# Patient Record
Sex: FEMALE | Race: WHITE
[De-identification: names, ages, dates, MRNs, and addresses within clinical notes are randomized per-mention and may not be internally consistent; named-entity substitution may affect disease eponyms.]

---

## 2019-01-10 ENCOUNTER — HOSPITAL ENCOUNTER (OUTPATIENT)
Dept: HOSPITAL 42 - ED | Age: 59
Setting detail: OBSERVATION
LOS: 1 days | Discharge: HOME | End: 2019-01-11
Attending: INTERNAL MEDICINE | Admitting: INTERNAL MEDICINE
Payer: SELF-PAY

## 2019-01-10 VITALS — BODY MASS INDEX: 29.5 KG/M2

## 2019-01-10 DIAGNOSIS — Z82.3: ICD-10-CM

## 2019-01-10 DIAGNOSIS — R20.2: ICD-10-CM

## 2019-01-10 DIAGNOSIS — Z82.49: ICD-10-CM

## 2019-01-10 DIAGNOSIS — I25.10: ICD-10-CM

## 2019-01-10 DIAGNOSIS — R55: Primary | ICD-10-CM

## 2019-01-10 LAB
ALBUMIN SERPL-MCNC: 4.6 G/DL (ref 3–4.8)
ALBUMIN/GLOB SERPL: 1.3 {RATIO} (ref 1.1–1.8)
ALT SERPL-CCNC: 32 U/L (ref 7–56)
APPEARANCE UR: CLEAR
AST SERPL-CCNC: 26 U/L (ref 14–36)
BASOPHILS # BLD AUTO: 0.06 K/MM3 (ref 0–2)
BASOPHILS NFR BLD: 1.5 % (ref 0–3)
BILIRUB UR-MCNC: NEGATIVE MG/DL
BUN SERPL-MCNC: 6 MG/DL (ref 7–21)
CALCIUM SERPL-MCNC: 9.8 MG/DL (ref 8.4–10.5)
COLOR UR: (no result)
EOSINOPHIL # BLD: 0.1 10*3/UL (ref 0–0.7)
EOSINOPHIL NFR BLD: 2.2 % (ref 1.5–5)
ERYTHROCYTE [DISTWIDTH] IN BLOOD BY AUTOMATED COUNT: 12.4 % (ref 11.5–14.5)
GFR NON-AFRICAN AMERICAN: > 60
GLUCOSE UR STRIP-MCNC: NEGATIVE MG/DL
GRANULOCYTES # BLD: 1.57 10*3/UL (ref 1.4–6.5)
GRANULOCYTES NFR BLD: 38.7 % (ref 50–68)
HGB BLD-MCNC: 14.1 G/DL (ref 12–16)
LEUKOCYTE ESTERASE UR-ACNC: NEGATIVE LEU/UL
LYMPHOCYTES # BLD: 2.1 10*3/UL (ref 1.2–3.4)
LYMPHOCYTES NFR BLD AUTO: 51.2 % (ref 22–35)
MCH RBC QN AUTO: 29 PG (ref 25–35)
MCHC RBC AUTO-ENTMCNC: 33.3 G/DL (ref 31–37)
MCV RBC AUTO: 87.2 FL (ref 80–105)
MONOCYTES # BLD AUTO: 0.3 10*3/UL (ref 0.1–0.6)
MONOCYTES NFR BLD: 6.4 % (ref 1–6)
PH UR STRIP: 7 [PH] (ref 4.7–8)
PLATELET # BLD: 258 10^3/UL (ref 120–450)
PMV BLD AUTO: 12.9 FL (ref 7–11)
PROT UR STRIP-MCNC: NEGATIVE MG/DL
RBC # BLD AUTO: 4.86 10^6/UL (ref 3.5–6.1)
RBC # UR STRIP: NEGATIVE /UL
SP GR UR STRIP: 1.01 (ref 1–1.03)
TROPONIN I SERPL-MCNC: < 0.01 NG/ML
UROBILINOGEN UR STRIP-ACNC: 0.2 E.U./DL
WBC # BLD AUTO: 4.1 10^3/UL (ref 4.5–11)

## 2019-01-10 PROCEDURE — 82746 ASSAY OF FOLIC ACID SERUM: CPT

## 2019-01-10 PROCEDURE — 81003 URINALYSIS AUTO W/O SCOPE: CPT

## 2019-01-10 PROCEDURE — 93306 TTE W/DOPPLER COMPLETE: CPT

## 2019-01-10 PROCEDURE — 84484 ASSAY OF TROPONIN QUANT: CPT

## 2019-01-10 PROCEDURE — 82607 VITAMIN B-12: CPT

## 2019-01-10 PROCEDURE — 93970 EXTREMITY STUDY: CPT

## 2019-01-10 PROCEDURE — 85025 COMPLETE CBC W/AUTO DIFF WBC: CPT

## 2019-01-10 PROCEDURE — 84443 ASSAY THYROID STIM HORMONE: CPT

## 2019-01-10 PROCEDURE — 72125 CT NECK SPINE W/O DYE: CPT

## 2019-01-10 PROCEDURE — 93005 ELECTROCARDIOGRAM TRACING: CPT

## 2019-01-10 PROCEDURE — 83735 ASSAY OF MAGNESIUM: CPT

## 2019-01-10 PROCEDURE — 97161 PT EVAL LOW COMPLEX 20 MIN: CPT

## 2019-01-10 PROCEDURE — 70496 CT ANGIOGRAPHY HEAD: CPT

## 2019-01-10 PROCEDURE — 70498 CT ANGIOGRAPHY NECK: CPT

## 2019-01-10 PROCEDURE — 36415 COLL VENOUS BLD VENIPUNCTURE: CPT

## 2019-01-10 PROCEDURE — 70450 CT HEAD/BRAIN W/O DYE: CPT

## 2019-01-10 PROCEDURE — 80061 LIPID PANEL: CPT

## 2019-01-10 PROCEDURE — 99285 EMERGENCY DEPT VISIT HI MDM: CPT

## 2019-01-10 PROCEDURE — 84100 ASSAY OF PHOSPHORUS: CPT

## 2019-01-10 PROCEDURE — 83036 HEMOGLOBIN GLYCOSYLATED A1C: CPT

## 2019-01-10 PROCEDURE — 87086 URINE CULTURE/COLONY COUNT: CPT

## 2019-01-10 PROCEDURE — 80053 COMPREHEN METABOLIC PANEL: CPT

## 2019-01-10 PROCEDURE — 95812 EEG 41-60 MINUTES: CPT

## 2019-01-10 NOTE — CT
Date of service: 



01/10/2019



PROCEDURE:  CT Angiography of the neck with contrast



HISTORY:

dizziness, weakness



COMPARISON:

None.



TECHNIQUE:

Contiguous axial images of the neck were obtained from the level of 

the skull-base to the superior mediastinum in the arteriographic 

phase of enhancement. Coronal and sagittal reformats or also 

generated. 



IV contrast dose: 100 cc of Omni 350



Radiation dose:



Total exam DLP = 402.04 mGy-cm.



This CT exam was performed using one or more of the following dose 

reduction techniques: Automated exposure control, adjustment of the 

mA and/or kV according to patient size, and/or use of iterative 

reconstruction technique.



FINDINGS:



RIGHT CAROTID ARTERIES:

Common Carotid Artery: Normal.



Carotid Bifurcation: Normal.



Internal Carotid Artery:Normal.



External Carotid Artery (proximal branches): Normal.



LEFT CAROTID ARTERIES:

Common Carotid Artery: Normal.



Carotid Bifurcation: Normal.



Internal Carotid Artery:Normal.



External Carotid Artery (proximal branches): Normal.



VERTEBRAL ARTERIES:

Right Vertebral Artery: Normal.



Left Vertebral Artery: Normal.



OTHER FINDINGS:

 no aortic atherosclerotic calcification or mural plaque present. 



IMPRESSION:

Normal CT Angiography of the neck.



CT Angiography of the Brain.



HISTORY:

dizziness, weakness



COMPARISON:

None available.



TECHNIQUE:

CT angiography of the intracranial arteries was performed. Coronal 

and sagittal maximum intensity projection reformated images were 

generated.



Radiation dose:



Total exam DLP = 402.04 mGy-cm.



This CT exam was performed using one or more of the following dose 

reduction techniques: Automated exposure control, adjustment of the 

mA and/or kV according to patient size, and/or use of iterative 

reconstruction technique.



FINDINGS:



INTERNAL CEREBRAL ARTERIES:

Unremarkable. The skull base, petrous, cavernous and supraclinoid 

segments are bilaterally widely patent. 



ANTERIOR CEREBRAL ARTERIES:

Unremarkable. A1 and A2 segments are widely patent. Smaller distal 

branches unremarkable, as visualized.



MIDDLE CEREBRAL ARTERIES:

Unremarkable. M1 and M2 segments are widely patent. Perisylvian 

branches grossly symmetric.



POSTERIOR CIRCULATION:

Basilar Artery: Unremarkable.



Distal Vertebral Arteries: Unremarkable.



Posterior Cerebral Arteries: Unremarkable.



Posterior Inferior Cerebellar Arteries: Unremarkable.



ANEURYSM/ VASCULAR MALFORMATIONS:

None.



OTHER FINDINGS:

None. 



IMPRESSION:

Unremarkable CT Angiography of the Brain.

## 2019-01-10 NOTE — ED PDOC
Arrival/HPI





- General


Historian: Patient





- History of Present Illness


Narrative History of Present Illness (Text): 


pt is a 58 yr old F with no PMH who has not visited a doctor for many years who 

presents with numerous complaints including HA, intermittent weakness of all 

four extremities, intermittent dizziness and recent fall with LOC, intermittent 

numbness and tingling of extremities. She additionally complains of intermittent

neck pain, chest pain, SOB, "seeing spots" when dizzy.  Patient states she has a

family history of stroke and cardiovascular illness. She currently endorses only

headache.  She currently denies any facial droop, speech difficulty, confusion, 

chest pain, shortness of breath, abdominal pain, dysuria, stool changes and 

extremity pain/weakness. 


01/10/19 15:35








Time/Duration: > month (3)


Symptom Course: Intermittent





<Bernadette Way - Last Filed: 01/10/19 17:40>





<Marcio Chappell - Last Filed: 01/10/19 19:29>





- General


Chief Complaint: Headache


Time Seen by Provider: 01/10/19 13:41





Past Medical History





- Provider Review


Nursing Documentation Reviewed: Yes





- Past History


Past History: No Previous





- Tetanus Immunization


Tetanus Immunization: Unknown





- Reproductive


Menopause: Yes





- Past Medical History


Past Medical History: No Previous





- Cardiac


Hx Cardiac Disorders: Yes


Hx Hypertension: Yes





- Pulmonary


Hx Respiratory Disorders: No





- Neurological


Hx Neurological Disorder: No





- HEENT


Hx HEENT Disorder: No





- Renal


Hx Renal Disorder: Yes


Hx Kidney Stones: Yes (LEFT)





- Endocrine/Metabolic


Hx Endocrine Disorders: No





- Hematological/Oncological


Hx Blood Disorders: No





- Integumentary


Hx Dermatological Disorder: No





- Musculoskeletal/Rheumatological


Hx Musculoskeletal Disorders: No


Hx Falls: Yes (last saturday, lost LOC)





- Genitourinary/Gynecological


Hx Genitourinary Disorders: No (BX LEFT BREAST MASS)





- Psychiatric


Hx Psychophysiologic Disorder: No


Hx Substance Use: No





- Past Surgical History


Past Surgical History: No Previous





- Surgical History


Hx Cardiac Catheterization: Yes





- Anesthesia


Hx Anesthesia: Yes


Hx Anesthesia Reactions: No


Hx Malignant Hyperthermia: No





- Suicidal Assessment


Feels Threatened In Home Enviroment: No





<Bernadette Way - Last Filed: 01/10/19 17:40>





Family/Social History





- Physician Review


Nursing Documentation Reviewed: Yes


Family/Social History: CVA/TIA, CAD/MI


Smoking Status: Never Smoked


Hx Alcohol Use: No


Hx Substance Use: No


Hx Substance Use Treatment: No





<Bernadette Way - Last Filed: 01/10/19 17:40>





Allergies/Home Meds





<Bernadette Way - Last Filed: 01/10/19 17:40>





<Marcio hCappell - Last Filed: 01/10/19 19:29>


Allergies/Adverse Reactions: 


Allergies





No Known Allergies Allergy (Verified 05/30/15 16:53)


   








Home Medications: 


                                    Home Meds











 Medication  Instructions  Recorded  Confirmed


 


No Known Home Med  05/30/15 01/10/19














Review of Systems





- Physician Review


All systems were reviewed & negative as marked: Yes





- Review of Systems


Constitutional: Normal.  absent: Fevers


Eyes: absent: Vision Changes (not currently)


Respiratory: absent: SOB, Cough


Cardiovascular: absent: Chest Pain


Gastrointestinal: absent: Abdominal Pain, Nausea, Vomiting


Musculoskeletal: Neck Pain.  absent: Back Pain


Skin: absent: Rash, Skin Lesions, Laceration, Abscess


Neurological: Headache.  absent: Dizziness, Focal Weakness, Gait Changes, 

Disequilibrium





<Bernadette Way - Last Filed: 01/10/19 17:40>





Physical Exam


Vital Signs Reviewed: Yes





Vital Signs











  Temp Pulse Resp BP Pulse Ox


 


 01/10/19 14:05  98.2 F  67  18  159/83 H  98











Temperature: Afebrile


Blood Pressure: Hypertensive


Pulse: Regular


Respiratory Rate: Normal


Appearance: Positive for: Well-Appearing, Non-Toxic, Comfortable


Pain Distress: None


Mental Status: Positive for: Alert and Oriented X 3





- Systems Exam


Head: Present: Atraumatic, Normocephalic


Extroacular Muscles: Present: EOMI


Mouth: Present: Moist Mucous Membranes


Neck: Present: Normal Range of Motion.  No: Meningeal Signs, MIDLINE TENDERNESS,

Paraspinal Tenderness


Respiratory/Chest: Present: Clear to Auscultation.  No: Respiratory Distress, 

Accessory Muscle Use


Cardiovascular: Present: Regular Rate and Rhythm, Normal S1, S2.  No: Murmurs


Abdomen: No: Tenderness, Distention, Peritoneal Signs


Upper Extremity: Present: Normal Inspection, Normal ROM, NORMAL PULSES, 

Neurovascularly Intact, Capillary Refill < 2s, Norm 2-Pt Discrimination.  No: 

Cyanosis, Edema, Tenderness, Swelling, Erythema, Deformity


Lower Extremity: Present: Normal Inspection, NORMAL PULSES, Normal ROM, Neurova

scularly Intact, Capillary Refill < 2 s.  No: Edema, CALF TENDERNESS, Cyanosis, 

Stanislav's Sign, Swelling, Erythema, Deformity


Neurological: Present: GCS=15, CN II-XII Intact, Speech Normal, Motor Func 

Grossly Intact, Normal Sensory Function, Gait Normal, Normal 2Pt Descrimination


Skin: Present: Warm, Dry, Normal Color.  No: Rashes


Psychiatric: Present: Alert, Oriented x 3, Normal Insight, Normal Concentration





<Bernadette Way - Last Filed: 01/10/19 17:40>





Vital Signs











  Temp Pulse Resp BP Pulse Ox


 


 01/10/19 19:03   77  20  126/85  100


 


 01/10/19 17:36  98.3 F  86  20  124/74  99


 


 01/10/19 16:39  98.7 F  90  18  124/57 L  100


 


 01/10/19 14:05  98.2 F  67  18  159/83 H  98














<Marcio Chappell - Last Filed: 01/10/19 19:29>





Medical Decision Making


ED Course and Treatment: 


Impression: 58 yr old female with recent LOC d/t dizziness and weakness, no 

current dizziness/weakness, current HA





Plan: 


CBC


CMP


CT head


CTA head and neck


reassess and dispo


01/10/19 15:45





patient discussed with Dr. Fishman, agreed to evaluate for admission


01/10/19 17:02








- Lab Interpretations


Lab Results: 





                                        











Total Bilirubin  0.4 mg/dL (0.2-1.3)   01/10/19  15:13    


 


AST  26 U/L (14-36)   01/10/19  15:13    


 


ALT  32 U/L (7-56)   01/10/19  15:13    


 


Alkaline Phosphatase  76 U/L ()   01/10/19  15:13    


 


Total Protein  8.2 g/dL (5.8-8.3)   01/10/19  15:13    


 


Albumin  4.6 g/dL (3.0-4.8)   01/10/19  15:13    


 


Globulin  3.6 gm/dL  01/10/19  15:13    


 


Albumin/Globulin Ratio  1.3  (1.1-1.8)   01/10/19  15:13    

















                                 01/10/19 15:13 





                                 01/10/19 15:13 





                                   Lab Results





01/10/19 15:13: Sodium 140, Potassium 3.8, Chloride 104, Carbon Dioxide 26, 

Anion Gap 13, BUN 6 L, Creatinine 0.6 L, Est GFR ( Amer) > 60, Est GFR (

Non-Af Amer) > 60, Random Glucose 101, Calcium 9.8, Phosphorus 3.6, Magnesium 

2.0, Total Bilirubin 0.4, AST 26, ALT 32, Alkaline Phosphatase 76, Total Protein

8.2, Albumin 4.6, Globulin 3.6, Albumin/Globulin Ratio 1.3


01/10/19 15:13: WBC 4.1 L, RBC 4.86, Hgb 14.1, Hct 42.4, MCV 87.2, MCH 29.0, 

MCHC 33.3, RDW 12.4, Plt Count 258, MPV 12.9 H, Gran % 38.7 L, Lymph % (Auto) 

51.2 H, Mono % (Auto) 6.4 H, Eos % (Auto) 2.2, Baso % (Auto) 1.5, Gran # 1.57, 

Lymph # (Auto) 2.1, Mono # (Auto) 0.3, Eos # (Auto) 0.1, Baso # (Auto) 0.06











- RAD Interpretation


Narrative RAD Interpretations (Text): 





01/10/19 15:51





                                Radiology Results





Head CT  01/10/19 14:29


IMPRESSION:


No acute intracranial findings 


 


 











                                Radiology Results





Head CT  01/10/19 14:29


IMPRESSION:


No acute intracranial findings 


 


 








Head/Neck CTA  01/10/19 15:17


IMPRESSION:


Normal CT Angiography of the neck.


 


CT Angiography of the Brain.


 


HISTORY:


dizziness, weakness


 


COMPARISON:


None available.


 


TECHNIQUE:


CT angiography of the intracranial arteries was performed. Coronal 


and sagittal maximum intensity projection reformated images were 


generated.


 


Radiation dose:


 


Total exam DLP = 402.04 mGy-cm.


 


This CT exam was performed using one or more of the following dose 


reduction techniques: Automated exposure control, adjustment of the 


mA and/or kV according to patient size, and/or use of iterative 


reconstruction technique.


 


FINDINGS:


 


INTERNAL CEREBRAL ARTERIES:


Unremarkable. The skull base, petrous, cavernous and supraclinoid 


segments are bilaterally widely patent. 


 


ANTERIOR CEREBRAL ARTERIES:


Unremarkable. A1 and A2 segments are widely patent. Smaller distal 


branches unremarkable, as visualized.


 


MIDDLE CEREBRAL ARTERIES:


Unremarkable. M1 and M2 segments are widely patent. Perisylvian 


branches grossly symmetric.


 


POSTERIOR CIRCULATION:


Basilar Artery: Unremarkable.


 


Distal Vertebral Arteries: Unremarkable.


 


Posterior Cerebral Arteries: Unremarkable.


 


Posterior Inferior Cerebellar Arteries: Unremarkable.


 


ANEURYSM/ VASCULAR MALFORMATIONS:


None.


 


OTHER FINDINGS:


None. 


 


IMPRESSION:


Unremarkable CT Angiography of the Brain.


 


 











01/10/19 18:30





Radiology Orders: 











01/10/19 14:29


HEAD W/O CONTRAST [CT] Stat 





01/10/19 15:17


CTA HEAD & NECK BUNDLE [CT] Stat 














<Bernadette Way - Last Filed: 01/10/19 17:40>


ED Course and Treatment: 








01/10/19 19:26


Impression: 58 year old female presents to emergency department complaining of 

HA, intermittent weakness of all four extremities, intermittent dizziness and 

recent fall with LOC, intermittent numbness and tingling of extremities.


In agreement with resident note which contains more details about the patient. 

Patient seen and evaluated with resident. Came up with plan and treatment 

together. 


Plan:


-- Antivert


-- Lovenox


-- Labs


-- Rehab (Physical Therapy)


-- Urinalysis


-- US








- Lab Interpretations


Lab Results: 





                                        











Troponin I  < 0.01 ng/mL  01/10/19  19:00    








                                        











Total Bilirubin  0.4 mg/dL (0.2-1.3)   01/10/19  15:13    


 


AST  26 U/L (14-36)   01/10/19  15:13    


 


ALT  32 U/L (7-56)   01/10/19  15:13    


 


Alkaline Phosphatase  76 U/L ()   01/10/19  15:13    


 


Total Protein  8.2 g/dL (5.8-8.3)   01/10/19  15:13    


 


Albumin  4.6 g/dL (3.0-4.8)   01/10/19  15:13    


 


Globulin  3.6 gm/dL  01/10/19  15:13    


 


Albumin/Globulin Ratio  1.3  (1.1-1.8)   01/10/19  15:13    








                                        











Urine Color  Light yellow  (YELLOW)   01/10/19  18:45    


 


Urine Appearance  Clear  (CLEAR)   01/10/19  18:45    


 


Urine pH  7.0  (4.7-8.0)   01/10/19  18:45    


 


Ur Specific Gravity  1.010  (1.005-1.035)   01/10/19  18:45    


 


Urine Protein  Negative mg/dL (<30 mg/dL)   01/10/19  18:45    


 


Urine Glucose (UA)  Negative mg/dL (NEGATIVE)   01/10/19  18:45    


 


Urine Ketones  Negative mg/dL (NEGATIVE)   01/10/19  18:45    


 


Urine Blood  Negative  (NEGATIVE)   01/10/19  18:45    


 


Urine Nitrate  Negative  (NEGATIVE)   01/10/19  18:45    


 


Urine Bilirubin  Negative  (NEGATIVE)   01/10/19  18:45    


 


Urine Urobilinogen  0.2 E.U./dL (<1 E.U./dL)   01/10/19  18:45    


 


Ur Leukocyte Esterase  Negative Payton/uL (NEGATIVE)   01/10/19  18:45    














- RAD Interpretation


Radiology Orders: 











01/10/19 14:29


HEAD W/O CONTRAST [CT] Stat 





01/10/19 15:17


CTA HEAD & NECK BUNDLE [CT] Stat 














- Medication Orders


Current Medication Orders: 











Enoxaparin Sodium (Lovenox)  40 mg SC DAILY FARHAN; Protocol


Meclizine HCl (Antivert)  12.5 mg PO Q6H PRN


   PRN Reason: Dizziness





Discontinued Medications





Acetaminophen (Tylenol 325mg Tab)  650 mg PO STAT STA


   Stop: 01/10/19 16:03


   Last Admin: 01/10/19 17:23  Dose: 650 mg





MAR Pain/Vitals


 Document     01/10/19 17:23  DM  (Rec: 01/10/19 17:24  [facility]  [id number]


     Pain Reassessment


      Is This A Pain ReAssessment?               No


     Presence of Pain


      Presence of Pain                           Yes


     Pain Scale Used


     Protocol:  PSCALES


      Pain Scale Used                            Numeric


     Location


      Left, Right or Bilateral                   Right


      Upper or Lower                             Lower


      Pain Location Body Site                    Arm


      Intensity                                  2





Sodium Chloride (Sodium Chloride 0.9%)  1,000 mls @ 999 mls/hr IV .Q1H1M STA


   Stop: 01/10/19 17:02


   Last Admin: 01/10/19 17:24  Dose: 999 mls/hr





eMAR Start Stop


 Document     01/10/19 17:24  DM  (Rec: 01/10/19 17:25  DM  [id number]


     Intravenous Solution


      Start Date                                 01/10/19


      Start Time                                 17:25





Metoclopramide HCl (Reglan)  10 mg IVP ACHS FARHAN


   Last Admin: 01/10/19 18:55 Dose:  Not Given


   Non-Admin Reason: Patient Refused





IVP Administration


 Document     01/10/19 18:55  DM  (Rec: 01/10/19 18:55  DM  [facility] [place] [id number])


     Charges for Administration


      # of IVP Administrations                   0














<Marcio Chappell - Last Filed: 01/10/19 19:29>





Disposition/Present on Arrival





- Present on Arrival


Any Indicators Present on Arrival: No


History of DVT/PE: No


History of Uncontrolled Diabetes: No


Urinary Catheter: No


History of Decub. Ulcer: No


History Surgical Site Infection Following: None





- Disposition


Have Diagnosis and Disposition been Completed?: Yes


Disposition Time: 17:02


Patient Plan: Admission





<Bernadette Way - Last Filed: 01/10/19 17:40>





<Marcio Chappell - Last Filed: 01/10/19 19:29>





- Disposition


Diagnosis: 


 Paresthesia





Disposition: HOSPITALIZED


Patient Problems: 


                             Current Active Problems











Problem Status Onset


 


Paresthesia Acute 











Condition: STABLE

## 2019-01-10 NOTE — CARD
--------------- APPROVED REPORT --------------





Date of service: 01/10/2019



EKG Measurement

Heart [id number]

NM [id number]

[id number]

[id number]

[id number]



<Conclusion>

Normal sinus rhythm

Normal Electrocardiogram

## 2019-01-10 NOTE — CT
Date of service: 



01/10/2019



PROCEDURE:  CT HEAD WITHOUT CONTRAST.



HISTORY:

fall LOC



COMPARISON:

05/30/2015



TECHNIQUE:

Axial computed tomography images were obtained through the head/brain 

without intravenous contrast.  



Radiation dose:



Total exam DLP = 925.37 mGy-cm.



This CT exam was performed using one or more of the following dose 

reduction techniques: Automated exposure control, adjustment of the 

mA and/or kV according to patient size, and/or use of iterative 

reconstruction technique.



FINDINGS:



HEMORRHAGE:

No intracranial hemorrhage. 



BRAIN:

No mass effect or edema.  No atrophy or chronic microvascular 

ischemic changes.



VENTRICLES:

Unremarkable. No hydrocephalus. 



CALVARIUM:

Unremarkable.



PARANASAL SINUSES:

Unremarkable as visualized. No significant inflammatory changes.



MASTOID AIR CELLS:

Unremarkable as visualized. No inflammatory changes.



OTHER FINDINGS:

None.



IMPRESSION:

No acute intracranial findings

## 2019-01-11 VITALS
TEMPERATURE: 97.5 F | RESPIRATION RATE: 20 BRPM | DIASTOLIC BLOOD PRESSURE: 80 MMHG | OXYGEN SATURATION: 99 % | SYSTOLIC BLOOD PRESSURE: 123 MMHG

## 2019-01-11 VITALS — HEART RATE: 63 BPM

## 2019-01-11 LAB
ALBUMIN SERPL-MCNC: 3.9 G/DL (ref 3–4.8)
ALBUMIN/GLOB SERPL: 1.3 {RATIO} (ref 1.1–1.8)
ALT SERPL-CCNC: 31 U/L (ref 7–56)
AST SERPL-CCNC: 20 U/L (ref 14–36)
BASOPHILS # BLD AUTO: 0.06 K/MM3 (ref 0–2)
BASOPHILS NFR BLD: 1.4 % (ref 0–3)
BUN SERPL-MCNC: 7 MG/DL (ref 7–21)
CALCIUM SERPL-MCNC: 9.4 MG/DL (ref 8.4–10.5)
EOSINOPHIL # BLD: 0.2 10*3/UL (ref 0–0.7)
EOSINOPHIL NFR BLD: 3.4 % (ref 1.5–5)
ERYTHROCYTE [DISTWIDTH] IN BLOOD BY AUTOMATED COUNT: 12.5 % (ref 11.5–14.5)
FOLATE SERPL-MCNC: > 20 NG/ML
GFR NON-AFRICAN AMERICAN: > 60
GRANULOCYTES # BLD: 1.23 10*3/UL (ref 1.4–6.5)
GRANULOCYTES NFR BLD: 28.1 % (ref 50–68)
HDLC SERPL-MCNC: 47 MG/DL (ref 29–60)
HGB BLD-MCNC: 12.4 G/DL (ref 12–16)
LDLC SERPL-MCNC: 105 MG/DL (ref 0–129)
LYMPHOCYTES # BLD: 2.6 10*3/UL (ref 1.2–3.4)
LYMPHOCYTES NFR BLD AUTO: 58.4 % (ref 22–35)
MCH RBC QN AUTO: 28 PG (ref 25–35)
MCHC RBC AUTO-ENTMCNC: 31.8 G/DL (ref 31–37)
MCV RBC AUTO: 88 FL (ref 80–105)
MONOCYTES # BLD AUTO: 0.4 10*3/UL (ref 0.1–0.6)
MONOCYTES NFR BLD: 8.7 % (ref 1–6)
PLATELET # BLD: 244 10^3/UL (ref 120–450)
PMV BLD AUTO: 12.5 FL (ref 7–11)
RBC # BLD AUTO: 4.43 10^6/UL (ref 3.5–6.1)
VIT B12 SERPL-MCNC: 430 PG/ML (ref 239–931)
WBC # BLD AUTO: 4.4 10^3/UL (ref 4.5–11)

## 2019-01-11 NOTE — CP.PCM.CON
<Derian Yadav - Last Filed: 01/11/19 16:58>





History of Present Illness





- History of Present Illness


History of Present Illness: 





Patient is a 58 year old female with past medical history of CAD s/p cardiac 

cath and herniated cervical disc presenting with complaints of syncope a week 

prior as well as 8/10 headache, heaviness, numbness and tingling tingling down 

upper and lower extremities bilaterally since last week. Patient states that she

was walking to her kitchen when she felt lightheaded and lost consciousness and 

later awakening on the floor with her dishes all over her. Patient states she 

has experienced many similar episodes in the past. She denies tongue biting, 

bladder or bowel incontinence. She admits to occasionally seeing flashing 

lights, headache, numbness and tingling of upper and lower extremities. Denies 

any aggravating or alleviating factors. Denies fever, chills, chest pain, 

shortness of breath, abdominal pain, constipation, diarrhea, dysuria. As per 

family at bedside patient has been checking her blood pressure the past few days

and SBP has been in 160s for which brother hs been giving his own BP medications

to control. 





PMH: CAD, herniated disc


PSH: cardiac cath, breast biopsy 


SHx: denies alcohol, tobacco, illicit drug use 


FHx: father (HTN, stroke) 


Allergies: NKDA


Home meds: none 


PMD: none 








Review of Systems





- Review of Systems


All systems: reviewed and no additional remarkable complaints except (what's 

mentioned in HPI)





Past Patient History





- Tetanus Immunizations


Tetanus Immunization: Unknown





- Past Social History


Smoking Status: Never Smoked





- CARDIAC


Hx Cardiac Disorders: Yes


Hx Hypertension: Yes





- PULMONARY


Hx Respiratory Disorders: No





- NEUROLOGICAL


Hx Neurological Disorder: No





- HEENT


Hx HEENT Problems: No





- RENAL


Hx Kidney Stones: Yes (LEFT)





- ENDOCRINE/METABOLIC


Hx Endocrine Disorders: No





- HEMATOLOGICAL/ONCOLOGICAL


Hx Blood Disorders: No





- INTEGUMENTARY


Hx Dermatological Problems: No





- MUSCULOSKELETAL/RHEUMATOLOGICAL


Hx Musculoskeletal Disorders: No


Hx Falls: Yes (syncopal episode on Saturday1/5/19)





- GENITOURINARY/GYNECOLOGICAL


Hx Genitourinary Disorders: No (BX LEFT BREAST MASS)





- PSYCHIATRIC


Hx Psychophysiologic Disorder: No


Hx Substance Use: No





- SURGICAL HISTORY


Hx Surgeries: Yes (CARDIAC CATH)


Hx Cardiac Catheterization: Yes





- ANESTHESIA


Hx Anesthesia: Yes


Hx Anesthesia Reactions: No


Hx Malignant Hyperthermia: No





Meds


Home Medications: 


                              Home Medication List











 Medication  Instructions  Recorded  Confirmed  Type


 


RX: Magnesium Oxide [Magnesium] 400 mg PO BID #60 capsule 01/11/19  Rx











Allergies/Adverse Reactions: 


                                    Allergies











Allergy/AdvReac Type Severity Reaction Status Date / Time


 


No Known Allergies Allergy   Verified 05/30/15 16:53














- Medications


Medications: 


                               Current Medications





Enoxaparin Sodium (Lovenox)  40 mg SC DAILY FARHAN; Protocol


   Last Admin: 01/11/19 09:17 Dose:  40 mg


Meclizine HCl (Antivert)  12.5 mg PO Q6H PRN


   PRN Reason: Dizziness


   Last Admin: 01/11/19 09:17 Dose:  12.5 mg











Physical Exam





- Head Exam


Head Exam: ATRAUMATIC, NORMAL INSPECTION, NORMOCEPHALIC





- Eye Exam


Eye Exam: EOMI, Normal appearance





- ENT Exam


ENT Exam: Mucous Membranes Moist





- Neck Exam


Neck exam: Positive for: Normal Inspection





- Respiratory Exam


Respiratory Exam: Clear to Auscultation Bilateral, NORMAL BREATHING PATTERN





- Cardiovascular Exam


Cardiovascular Exam: REGULAR RHYTHM, +S1, +S2





- GI/Abdominal Exam


GI & Abdominal Exam: Normal Bowel Sounds





- Extremities Exam


Extremities exam: Positive for: normal inspection





- Back Exam


Back exam: NORMAL INSPECTION





- Neurological Exam


Neurological exam: Alert, CN II-XII Intact, Oriented x3





- Expanded Neurological Exam


  ** Expanded


Speech: Fluid Speech


Cranial nerves: EOM's Intact: Normal, Facial Sensation: Normal, Tongue 

Deviation: Normal


Cerebellar Function: Finger to Nose: Normal, Heel to Shin: Normal, Romberg: 

Normal


Upper motor neuron: Babinski Sign: Normal, Erickson Neglect: Normal, Pronator Drift:

 Normal, Sensory Extinction: Normal


Sensory exam: Lower Extremity 2 Point Discrimination: Normal, Lower Extremity 

Light Touch: Normal, Lower Extremity Pin Prick: Normal, Upper Extremity 2 Point 

Discrimination: Normal, Upper Extremity Light Touch: Normal, Upper Extremity Pin

 Prick: Normal


Neuro motor strength exam: Left Upper Extremity: 4, Right Upper Extremity: 4, 

Left Lower Extremity: 4, Right Lower Extremity: 4


DTR: Patellar Left: 0


Coma Scale Eye Opening: SPONTANEOUS


Coma Scale Motor Response: OBEYS COMMANDS





- Psychiatric Exam


Psychiatric exam: Normal Affect, Normal Mood





- Skin


Skin Exam: Normal Color, Warm





Results





- Vital Signs


Recent Vital Signs: 


                                Last Vital Signs











Temp  97.5 F L  01/11/19 06:00


 


Pulse  60   01/11/19 06:00


 


Resp  20   01/11/19 06:00


 


BP  123/80   01/11/19 06:00


 


Pulse Ox  99   01/11/19 06:00














- Labs


Result Diagrams: 


                                 01/11/19 06:00





                                 01/11/19 06:00


Labs: 


                         Laboratory Results - last 24 hr











  01/10/19 01/10/19 01/10/19





  15:13 15:13 18:45


 


WBC  4.1 L  


 


RBC  4.86  


 


Hgb  14.1  


 


Hct  42.4  


 


MCV  87.2  


 


MCH  29.0  


 


MCHC  33.3  


 


RDW  12.4  


 


Plt Count  258  


 


MPV  12.9 H  


 


Gran %  38.7 L  


 


Lymph % (Auto)  51.2 H  


 


Mono % (Auto)  6.4 H  


 


Eos % (Auto)  2.2  


 


Baso % (Auto)  1.5  


 


Gran #  1.57  


 


Lymph # (Auto)  2.1  


 


Mono # (Auto)  0.3  


 


Eos # (Auto)  0.1  


 


Baso # (Auto)  0.06  


 


Sodium   140 


 


Potassium   3.8 


 


Chloride   104 


 


Carbon Dioxide   26 


 


Anion Gap   13 


 


BUN   6 L 


 


Creatinine   0.6 L 


 


Est GFR ( Amer)   > 60 


 


Est GFR (Non-Af Amer)   > 60 


 


Random Glucose   101 


 


Calcium   9.8 


 


Phosphorus   3.6 


 


Magnesium   2.0 


 


Total Bilirubin   0.4 


 


AST   26 


 


ALT   32 


 


Alkaline Phosphatase   76 


 


Troponin I   


 


Total Protein   8.2 


 


Albumin   4.6 


 


Globulin   3.6 


 


Albumin/Globulin Ratio   1.3 


 


Triglycerides   


 


Cholesterol   


 


LDL Cholesterol Direct   


 


HDL Cholesterol   


 


TSH 3rd Generation   


 


Urine Color    Light yellow


 


Urine Appearance    Clear


 


Urine pH    7.0


 


Ur Specific Gravity    1.010


 


Urine Protein    Negative


 


Urine Glucose (UA)    Negative


 


Urine Ketones    Negative


 


Urine Blood    Negative


 


Urine Nitrate    Negative


 


Urine Bilirubin    Negative


 


Urine Urobilinogen    0.2


 


Ur Leukocyte Esterase    Negative














  01/10/19 01/11/19 01/11/19





  19:00 06:00 06:00


 


WBC   4.4 L 


 


RBC   4.43 


 


Hgb   12.4 


 


Hct   39.0 


 


MCV   88.0 


 


MCH   28.0 


 


MCHC   31.8 


 


RDW   12.5 


 


Plt Count   244 


 


MPV   12.5 H 


 


Gran %   28.1 L 


 


Lymph % (Auto)   58.4 H 


 


Mono % (Auto)   8.7 H 


 


Eos % (Auto)   3.4 


 


Baso % (Auto)   1.4 


 


Gran #   1.23 L 


 


Lymph # (Auto)   2.6 


 


Mono # (Auto)   0.4 


 


Eos # (Auto)   0.2 


 


Baso # (Auto)   0.06 


 


Sodium    140


 


Potassium    4.3


 


Chloride    108 H


 


Carbon Dioxide    27


 


Anion Gap    9 L


 


BUN    7


 


Creatinine    0.7


 


Est GFR ( Amer)    > 60


 


Est GFR (Non-Af Amer)    > 60


 


Random Glucose    96


 


Calcium    9.4


 


Phosphorus   


 


Magnesium   


 


Total Bilirubin    0.3


 


AST    20


 


ALT    31


 


Alkaline Phosphatase    60


 


Troponin I  < 0.01  


 


Total Protein    6.9


 


Albumin    3.9


 


Globulin    3.0


 


Albumin/Globulin Ratio    1.3


 


Triglycerides    77


 


Cholesterol    180


 


LDL Cholesterol Direct    105


 


HDL Cholesterol    47


 


TSH 3rd Generation   


 


Urine Color   


 


Urine Appearance   


 


Urine pH   


 


Ur Specific Gravity   


 


Urine Protein   


 


Urine Glucose (UA)   


 


Urine Ketones   


 


Urine Blood   


 


Urine Nitrate   


 


Urine Bilirubin   


 


Urine Urobilinogen   


 


Ur Leukocyte Esterase   














  01/11/19





  06:00


 


WBC 


 


RBC 


 


Hgb 


 


Hct 


 


MCV 


 


MCH 


 


MCHC 


 


RDW 


 


Plt Count 


 


MPV 


 


Gran % 


 


Lymph % (Auto) 


 


Mono % (Auto) 


 


Eos % (Auto) 


 


Baso % (Auto) 


 


Gran # 


 


Lymph # (Auto) 


 


Mono # (Auto) 


 


Eos # (Auto) 


 


Baso # (Auto) 


 


Sodium 


 


Potassium 


 


Chloride 


 


Carbon Dioxide 


 


Anion Gap 


 


BUN 


 


Creatinine 


 


Est GFR ( Amer) 


 


Est GFR (Non-Af Amer) 


 


Random Glucose 


 


Calcium 


 


Phosphorus 


 


Magnesium 


 


Total Bilirubin 


 


AST 


 


ALT 


 


Alkaline Phosphatase 


 


Troponin I 


 


Total Protein 


 


Albumin 


 


Globulin 


 


Albumin/Globulin Ratio 


 


Triglycerides 


 


Cholesterol 


 


LDL Cholesterol Direct 


 


HDL Cholesterol 


 


TSH 3rd Generation  5.74 H


 


Urine Color 


 


Urine Appearance 


 


Urine pH 


 


Ur Specific Gravity 


 


Urine Protein 


 


Urine Glucose (UA) 


 


Urine Ketones 


 


Urine Blood 


 


Urine Nitrate 


 


Urine Bilirubin 


 


Urine Urobilinogen 


 


Ur Leukocyte Esterase 














Assessment & Plan





- Assessment and Plan (Free Text)


Assessment: 





B/L Upper and Lower extremity paresthesias


-r/o Vitamin B12, Folate deficiency


-Vitamin B12 supplementation since patient is vegan





Headaches


-Most likely tension headache


-Toradol


-Magnesium oxide 400 mg BID


-Patient can follow up with a neurologist as outpatient for further management





Syncopal episodes


-CTA head and neck reveal no abnormalities


-Recommend linq cardiac device to monitor for arrythmias 


-Follow up with Primary care physician and cardiologist. Discussed extensively 

with patient that she needs to establish care with a PMD so she can follow up 

with a cardiologist. Discussed with patient that a linq monitor would help 

detect abnormalities that we may not have been able to detect while here inhouse

 since the duration is longer. Discussed plan in full detail with patient and 

her family. 








<Korya,Maik - Last Filed: 01/13/19 13:43>





Results





- Vital Signs


Recent Vital Signs: 


                                Last Vital Signs











Temp  97.5 F L  01/11/19 06:00


 


Pulse  63   01/11/19 10:00


 


Resp  20   01/11/19 06:00


 


BP  123/80   01/11/19 06:00


 


Pulse Ox  99   01/11/19 06:00














- Labs


Result Diagrams: 


                                 01/11/19 06:00





                                 01/11/19 06:00





Attending/Attestation





- Attestation


I have personally seen and examined this patient.: Yes


I have fully participated in the care of the patient.: Yes


I have reviewed all pertinent clinical information: Yes


Notes (Text): 


 I agree with the assessment and plan:


-Follow up with Primary care physician and cardiologist. Discussed extensively 

with patient that she needs to establish care with a PMD so she can follow up 

with a cardiologist. Discussed with patient that a linq monitor would help 

detect abnormalities that we may not have been able to detect while here inhouse

 since the duration is longer.

## 2019-01-11 NOTE — CP.PCM.DIS
<Jessica Jean Baptiste - Last Filed: 01/11/19 15:47>





Provider





- Provider


Date of Admission: 


01/10/19 17:02





Attending physician: 


Eric Fishman MD





Primary care physician: 


NO PRIMARY CARE PROVIDER





Consults: 








01/10/19 17:03


Physician Consult Stat 


   Comment: 


   Consulting Provider: Maik Lezama


   Consulting Physician: Maik Lezama


   Reason for Consult: parasthesias











Time Spent in preparation of Discharge (in minutes): 35





Hospital Course





- Lab Results


Lab Results: 


                             Most Recent Lab Values











WBC  4.4 10^3/uL (4.5-11.0)  L  01/11/19  06:00    


 


RBC  4.43 10^6/uL (3.5-6.1)   01/11/19  06:00    


 


Hgb  12.4 g/dL (12.0-16.0)   01/11/19  06:00    


 


Hct  39.0 % (36.0-48.0)   01/11/19  06:00    


 


MCV  88.0 fl (80.0-105.0)   01/11/19  06:00    


 


MCH  28.0 pg (25.0-35.0)   01/11/19  06:00    


 


MCHC  31.8 g/dl (31.0-37.0)   01/11/19  06:00    


 


RDW  12.5 % (11.5-14.5)   01/11/19  06:00    


 


Plt Count  244 10^3/uL (120.0-450.0)   01/11/19  06:00    


 


MPV  12.5 fl (7.0-11.0)  H  01/11/19  06:00    


 


Gran %  28.1 % (50.0-68.0)  L  01/11/19  06:00    


 


Lymph % (Auto)  58.4 % (22.0-35.0)  H  01/11/19  06:00    


 


Mono % (Auto)  8.7 % (1.0-6.0)  H  01/11/19  06:00    


 


Eos % (Auto)  3.4 % (1.5-5.0)   01/11/19  06:00    


 


Baso % (Auto)  1.4 % (0.0-3.0)   01/11/19  06:00    


 


Gran #  1.23  (1.4-6.5)  L  01/11/19  06:00    


 


Lymph # (Auto)  2.6  (1.2-3.4)   01/11/19  06:00    


 


Mono # (Auto)  0.4  (0.1-0.6)   01/11/19  06:00    


 


Eos # (Auto)  0.2  (0.0-0.7)   01/11/19  06:00    


 


Baso # (Auto)  0.06 K/mm3 (0.0-2.0)   01/11/19  06:00    


 


Sodium  140 mmol/L (132-148)   01/11/19  06:00    


 


Potassium  4.3 mmol/L (3.6-5.0)   01/11/19  06:00    


 


Chloride  108 mmol/L ()  H  01/11/19  06:00    


 


Carbon Dioxide  27 mmol/L (21-33)   01/11/19  06:00    


 


Anion Gap  9  (10-20)  L  01/11/19  06:00    


 


BUN  7 mg/dL (7-21)   01/11/19  06:00    


 


Creatinine  0.7 mg/dl (0.7-1.2)   01/11/19  06:00    


 


Est GFR ( Amer)  > 60   01/11/19  06:00    


 


Est GFR (Non-Af Amer)  > 60   01/11/19  06:00    


 


Random Glucose  96 mg/dL ()   01/11/19  06:00    


 


Hemoglobin A1c  5.9 % (4.2-6.5)   01/11/19  06:00    


 


Calcium  9.4 mg/dL (8.4-10.5)   01/11/19  06:00    


 


Phosphorus  3.6 mg/dL (2.5-4.5)   01/10/19  15:13    


 


Magnesium  2.0 mg/dL (1.7-2.2)   01/10/19  15:13    


 


Total Bilirubin  0.3 mg/dL (0.2-1.3)   01/11/19  06:00    


 


AST  20 U/L (14-36)   01/11/19  06:00    


 


ALT  31 U/L (7-56)   01/11/19  06:00    


 


Alkaline Phosphatase  60 U/L ()   01/11/19  06:00    


 


Troponin I  < 0.01 ng/mL  01/10/19  19:00    


 


Total Protein  6.9 g/dL (5.8-8.3)   01/11/19  06:00    


 


Albumin  3.9 g/dL (3.0-4.8)   01/11/19  06:00    


 


Globulin  3.0 gm/dL  01/11/19  06:00    


 


Albumin/Globulin Ratio  1.3  (1.1-1.8)   01/11/19  06:00    


 


Triglycerides  77 mg/dL ()   01/11/19  06:00    


 


Cholesterol  180 mg/dL (130-200)   01/11/19  06:00    


 


LDL Cholesterol Direct  105 mg/dL (0-129)   01/11/19  06:00    


 


HDL Cholesterol  47 mg/dL (29-60)   01/11/19  06:00    


 


Vitamin B12  430 pg/mL (239-931)   01/11/19  06:00    


 


Folate  > 20.0 ng/mL  01/10/19  19:00    


 


TSH 3rd Generation  5.74 mIU/mL (0.46-4.68)  H  01/11/19  06:00    


 


Urine Color  Light yellow  (YELLOW)   01/10/19  18:45    


 


Urine Appearance  Clear  (CLEAR)   01/10/19  18:45    


 


Urine pH  7.0  (4.7-8.0)   01/10/19  18:45    


 


Ur Specific Gravity  1.010  (1.005-1.035)   01/10/19  18:45    


 


Urine Protein  Negative mg/dL (<30 mg/dL)   01/10/19  18:45    


 


Urine Glucose (UA)  Negative mg/dL (NEGATIVE)   01/10/19  18:45    


 


Urine Ketones  Negative mg/dL (NEGATIVE)   01/10/19  18:45    


 


Urine Blood  Negative  (NEGATIVE)   01/10/19  18:45    


 


Urine Nitrate  Negative  (NEGATIVE)   01/10/19  18:45    


 


Urine Bilirubin  Negative  (NEGATIVE)   01/10/19  18:45    


 


Urine Urobilinogen  0.2 E.U./dL (<1 E.U./dL)   01/10/19  18:45    


 


Ur Leukocyte Esterase  Negative Payton/uL (NEGATIVE)   01/10/19  18:45    














- Hospital Course


Hospital Course: 


    58 year old female with past medical history of CAD s/p cardiac cath and 

herniated cervical disc presenting with chief complaint of syncope that happened

a week prior. Patient states that she was walking to her kitchen when she felt 

lightheaded and lost consciousness. She remembers waking up on the floor and 

feeling weak for several hours after. She denies tongue biting, bladder or bowel

incontinence. She admits to occasionally seeing flashing lights, headache, 

numbness and tingling of upper and lower extremities. Denies any aggravating or 

alleviating factors. Denies fever, chills, chest pain, shortness of breath, 

abdominal pain, constipation, diarrhea, dysuria. As per family at bedside 

patient has been checking her blood pressure the past few days and SBP has been 

in 160s. 


     CT head, CTA of head and neck, and Ct of cervical spine were unremarkable. 

The patient underwent an echocardiogram that was not interpreted. The patient's 

Vitamin B12 and folate were also within normal limits. Neurology was consulted 

and recommended the patient get a Linq cardiac device to monitor for arrhythmias

as well as Magnesium Oxide 400 mg BID. The patient was discharged with the below

written instructions and recommendations. 





- Date & Time of H&P


Date of H&P: 01/11/19


Time of H&P: 15:49





Discharge Exam





- Head Exam


Head Exam: ATRAUMATIC, NORMOCEPHALIC





- Eye Exam


Eye Exam: EOMI, Normal appearance, PERRL





- Respiratory Exam


Respiratory Exam: Clear to PA & Lateral, NORMAL BREATHING PATTERN





- Cardiovascular Exam


Cardiovascular Exam: RRR, +S1, +S2





- Extremities Exam


Extremities exam: normal inspection





- Neurological Exam


Neurological exam: Alert, CN II-XII Intact, Oriented x3





- Psychiatric Exam


Psychiatric exam: Normal Affect, Normal Mood





- Skin


Skin Exam: Dry, Intact, Normal Color, Warm





Discharge Plan





- Discharge Medications


Prescriptions: 


Magnesium Oxide [Magnesium] 400 mg PO BID #60 capsule





- Follow Up Plan


Condition: STABLE


Disposition: HOME/ ROUTINE


Instructions:  Chest Pain (DC), Paresthesias (DC), Hand Numbness


Additional Instructions: 


Please follow up with your primary care doctor within 3-5 days of discharge. If 

you do not have one you can make an appointment with our [facility] 

Clinic. 





Please take a multivitamin daily which is over the counter. 





Recommend to follow up with cardiology as outpatient for possible holter 

monitor. 





You were sent a prescription of Magnesium oxide 400mg ONE PILL TWICE PER DAY. 





If your symptoms return, please go to the nearest emergency department. 


Referrals: 


[facility] [place] [facility] [place] Follow up with primary


PCP,NO [Primary Care Provider] - Follow up with primary





<Rangasamy,Ajantha - Last Filed: 01/11/19 17:48>





Provider





- Provider


Date of Admission: 


01/10/19 17:02





Attending physician: 


Eric Fishman MD





Primary care physician: 


NO PRIMARY CARE PROVIDER





Consults: 








01/10/19 17:03


Physician Consult Stat 


   Comment: 


   Consulting Provider: Maik Lezama


   Consulting Physician: Maik Lezama


   Reason for Consult: [facility] Course





- Lab Results


Lab Results: 


                             Most Recent Lab Values











WBC  4.4 10^3/uL (4.5-11.0)  L  01/11/19  06:00    


 


RBC  4.43 10^6/uL (3.5-6.1)   01/11/19  06:00    


 


Hgb  12.4 g/dL (12.0-16.0)   01/11/19  06:00    


 


Hct  39.0 % (36.0-48.0)   01/11/19  06:00    


 


MCV  88.0 fl (80.0-105.0)   01/11/19  06:00    


 


MCH  28.0 pg (25.0-35.0)   01/11/19  06:00    


 


MCHC  31.8 g/dl (31.0-37.0)   01/11/19  06:00    


 


RDW  12.5 % (11.5-14.5)   01/11/19  06:00    


 


Plt Count  244 10^3/uL (120.0-450.0)   01/11/19  06:00    


 


MPV  12.5 fl (7.0-11.0)  H  01/11/19  06:00    


 


Gran %  28.1 % (50.0-68.0)  L  01/11/19  06:00    


 


Lymph % (Auto)  58.4 % (22.0-35.0)  H  01/11/19  06:00    


 


Mono % (Auto)  8.7 % (1.0-6.0)  H  01/11/19  06:00    


 


Eos % (Auto)  3.4 % (1.5-5.0)   01/11/19  06:00    


 


Baso % (Auto)  1.4 % (0.0-3.0)   01/11/19  06:00    


 


Gran #  1.23  (1.4-6.5)  L  01/11/19  06:00    


 


Lymph # (Auto)  2.6  (1.2-3.4)   01/11/19  06:00    


 


Mono # (Auto)  0.4  (0.1-0.6)   01/11/19  06:00    


 


Eos # (Auto)  0.2  (0.0-0.7)   01/11/19  06:00    


 


Baso # (Auto)  0.06 K/mm3 (0.0-2.0)   01/11/19  06:00    


 


Sodium  140 mmol/L (132-148)   01/11/19  06:00    


 


Potassium  4.3 mmol/L (3.6-5.0)   01/11/19  06:00    


 


Chloride  108 mmol/L ()  H  01/11/19  06:00    


 


Carbon Dioxide  27 mmol/L (21-33)   01/11/19  06:00    


 


Anion Gap  9  (10-20)  L  01/11/19  06:00    


 


BUN  7 mg/dL (7-21)   01/11/19  06:00    


 


Creatinine  0.7 mg/dl (0.7-1.2)   01/11/19  06:00    


 


Est GFR ( Amer)  > 60   01/11/19  06:00    


 


Est GFR (Non-Af Amer)  > 60   01/11/19  06:00    


 


Random Glucose  96 mg/dL ()   01/11/19  06:00    


 


Hemoglobin A1c  5.9 % (4.2-6.5)   01/11/19  06:00    


 


Calcium  9.4 mg/dL (8.4-10.5)   01/11/19  06:00    


 


Phosphorus  3.6 mg/dL (2.5-4.5)   01/10/19  15:13    


 


Magnesium  2.0 mg/dL (1.7-2.2)   01/10/19  15:13    


 


Total Bilirubin  0.3 mg/dL (0.2-1.3)   01/11/19  06:00    


 


AST  20 U/L (14-36)   01/11/19  06:00    


 


ALT  31 U/L (7-56)   01/11/19  06:00    


 


Alkaline Phosphatase  60 U/L ()   01/11/19  06:00    


 


Troponin I  < 0.01 ng/mL  01/10/19  19:00    


 


Total Protein  6.9 g/dL (5.8-8.3)   01/11/19  06:00    


 


Albumin  3.9 g/dL (3.0-4.8)   01/11/19  06:00    


 


Globulin  3.0 gm/dL  01/11/19  06:00    


 


Albumin/Globulin Ratio  1.3  (1.1-1.8)   01/11/19  06:00    


 


Triglycerides  77 mg/dL ()   01/11/19  06:00    


 


Cholesterol  180 mg/dL (130-200)   01/11/19  06:00    


 


LDL Cholesterol Direct  105 mg/dL (0-129)   01/11/19  06:00    


 


HDL Cholesterol  47 mg/dL (29-60)   01/11/19  06:00    


 


Vitamin B12  430 pg/mL (239-931)   01/11/19  06:00    


 


Folate  > 20.0 ng/mL  01/10/19  19:00    


 


TSH 3rd Generation  5.74 mIU/mL (0.46-4.68)  H  01/11/19  06:00    


 


Urine Color  Light yellow  (YELLOW)   01/10/19  18:45    


 


Urine Appearance  Clear  (CLEAR)   01/10/19  18:45    


 


Urine pH  7.0  (4.7-8.0)   01/10/19  18:45    


 


Ur Specific Gravity  1.010  (1.005-1.035)   01/10/19  18:45    


 


Urine Protein  Negative mg/dL (<30 mg/dL)   01/10/19  18:45    


 


Urine Glucose (UA)  Negative mg/dL (NEGATIVE)   01/10/19  18:45    


 


Urine Ketones  Negative mg/dL (NEGATIVE)   01/10/19  18:45    


 


Urine Blood  Negative  (NEGATIVE)   01/10/19  18:45    


 


Urine Nitrate  Negative  (NEGATIVE)   01/10/19  18:45    


 


Urine Bilirubin  Negative  (NEGATIVE)   01/10/19  18:45    


 


Urine Urobilinogen  0.2 E.U./dL (<1 E.U./dL)   01/10/19  18:45    


 


Ur Leukocyte Esterase  Negative Payton/uL (NEGATIVE)   01/10/19  18:45    














Attending/Attestation





- Attestation


I have personally seen and examined this patient.: Yes


I have fully participated in the care of the patient.: Yes


I have reviewed all pertinent clinical information, including history, physical 

exam and plan: Yes


Notes (Text): 





01/11/19 17:38





Attending note;





Patient seen and examined with resident.


Patient is alert and awake.


Complaining of nonspecific numbness.


Denies any neck pain.


No orthostatic blood pressure changes noted.


Tolerating diet well.





Patient is a 58 year old female with past medical history herniated cervical 

disc presenting with chief complaint of syncope that happened a week prior. 

Patient states that she was walking to her kitchen when she felt lightheaded and

lost consciousness.


Patient has complained of nonspecific numbness and weakness all over her body.





1. Syncope; patient complains of generalized nonspecific symptoms.


Questionable loss of consciousness.


No history of injury, tongue bite or incontinence. CT head is negative. CT of 

head and neck is negative.


Neurology evaluation appreciated.


Possible anxiety related issues suspected.


2.  No hypertension noted. No arrhythmia.


EKG showed normal sinus rhythm.


Echocardiogram normal.


3. Leg pain; lower extremity Doppler is negative.





CT of the cervical spine is negative for any acute findings.


Physical therapy evaluation appreciated.





Discharge patient home today.





Outpatient psychiatric evaluation recommended.





The diagnosis, follow-up plan discussed with patient and patient's brother in 

detail.





Upon discharge patient will follow-up with PMD Dr. Morrison.














01/11/19 17:46





01/11/19 17:47

## 2019-01-11 NOTE — CARD
--------------- APPROVED REPORT --------------





Date of service: 01/11/2019



EXAM: Two-dimensional and M-mode echocardiogram with Doppler and 

color Doppler.



INDICATION

BUBBLE STUDY SYNCOPE VS.TIA



2D DIMENSIONS 

Left Atrium (2D)3.4   (1.6-4.0cm)IVSd0.9   (0.7-1.1cm)

LVDd4.3   (3.9-5.9cm)PWd0.9   (0.7-1.1cm)

LVDs2.7   (2.5-4.0cm)FS (%) 36.8   %

LVEF (%)67.0   (>50%)



M-Mode DIMENSIONS 

Aortic Root3.10   (2.2-3.7cm)Aortic Cusp Exc.1.60   (1.5-2.0cm)



Aortic Valve

AoV Peak Vnpwykyc126.0cm/Burton Peak GR.12mmHg



Mitral Valve

MV E Zyceerpt003.0cm/sMV A Qiwuunub09.0cm/sE/A ratio1.6



TDI

Lateral E' Peak V12.00cm/sMedial E' Peak V13.20cm/sE/Lateral 

E'8.9

E/Medial E'8.1



Pulmonary Valve

PV Peak Emetzoqw77.2cm/sPV Peak Grad.3mmHg



Tricuspid Valve

TR Peak Hypuhuif952yh/sRAP [id number] Peak Gr.21mmHg

[id number]



 LEFT VENTRICLE 

The left ventricle is normal size. There is normal left ventricular 

wall thickness. The left ventricular function is normal. The left 

ventricular ejection fraction is within the normal range. There is 

normal LV segmental wall motion. The left ventricular diastolic 

function is normal.



 RIGHT VENTRICLE 

The right ventricle is normal size. There is normal right ventricular 

wall thickness. The right ventricular systolic function is normal.



 ATRIA 

The left atrium size is normal. The right atrium size is normal. The 

interatrial septum is intact 



 AORTIC VALVE 

The aortic valve is normal in structure. No aortic regurgitation is 

present. There is no aortic valvular stenosis. 



 MITRAL VALVE 

The mitral valve is normal in structure. Mitral regurgitation is 

trace to mild.



 TRICUSPID VALVE 

There is trace tricuspid regurgitation.



 PULMONIC VALVE 

The pulmonary valve is normal in structure. There is no pulmonic 

valvular regurgitation. 



 GREAT VESSELS 

The aortic root is normal in size. The IVC is normal in size and 

collapses >50% with inspiration.



<Conclusion>

There is normal left ventricular wall thickness.

The left ventricular function is normal.

The left ventricular ejection fraction is within the normal range.

There is normal LV segmental wall motion.

The left ventricular diastolic function is normal.

Mitral regurgitation is trace to mild.

The interatrial septum is intact

## 2019-01-11 NOTE — CT
Date of service: 



01/11/2019



PROCEDURE:  CT Cervical Spine without contrast



HISTORY:

Neuropathy Evaluation



COMPARISON:

None available.



TECHNIQUE:

Axial computed tomography images were obtained of the cervical spine 

without the use of intravenous contrast. Coronal and sagittal 

reformatted images were created and reviewed.



Radiation dose:



Total exam DLP = 575.48 mGy-cm.



This CT exam was performed using one or more of the following dose 

reduction techniques: Automated exposure control, adjustment of the 

mA and/or kV according to patient size, and/or use of iterative 

reconstruction technique.



FINDINGS:



VERTEBRAE:

No fracture. Normal alignment. No destructive bony lesion.



DISCS/SPINAL CANAL/NEURAL FORAMINA:

No significant central canal or neural foraminal stenosis. Disc 

degeneration at C5-6 with anterior osteophytes



PARASPINAL SOFT TISSUES:

Unremarkable. 



OTHER FINDINGS:

None.



IMPRESSION:

Unremarkable CT of the cervical spine.

## 2019-01-12 NOTE — PCM.EEG
Electroencephalogram Report





- Electroencephalogram Report


Procedure Date: 01/11/19


Medication: 





Meclizine, Ketorolac


Interpretation: 











Technical Information: This was a 16  -channel EEG, 1-channel EKG routine EEG 

performed using an [employer] machine. Electrodes were applied using the 10/20 

international placement system. 


Start 9;40


End 10;25


Total 45 min





Clinical Information: numbness/seizures. 





During resting wakefulness there was a symmetric posterior dominant rhythm at 

8.5-9.5 Hz, 30-50 uV, which was reactive to eye opening and closing.  Drowsiness

(10;01) was associated with fragmentation of the posterior dominant rhythm and 

with slow roving eye movements. Light sleep (10;05) was recorded and was 

characterized by central vertex waves, sleep spindles, and bilateral theta 

slowing. 





Hyperventilation was not performed.  





Photic stimulation was   performed and there were no changes on  the record. 





Focal abnormality; none





ECG was associated with a normal sinus rhythm.








. 





Impression: 





This is  a normal awake  drowsy and sleep  electroencephalogram

## 2021-07-10 NOTE — CP.PCM.HP
<Marcio Chappell - Last Filed: 01/10/19 19:26>





Meds


Allergies/Adverse Reactions: 


                                    Allergies











Allergy/AdvReac Type Severity Reaction Status Date / Time


 


No Known Allergies Allergy   Verified 05/30/15 16:53














Results





- Vital Signs


Recent Vital Signs: 





                                Last Vital Signs











Temp  98.3 F   01/10/19 17:36


 


Pulse  77   01/10/19 19:03


 


Resp  20   01/10/19 19:03


 


BP  126/85   01/10/19 19:03


 


Pulse Ox  100   01/10/19 19:03














- Labs


Result Diagrams: 


                                 01/10/19 15:13





                                 01/10/19 15:13


Labs: 





                         Laboratory Results - last 24 hr











  01/10/19 01/10/19 01/10/19





  15:13 15:13 18:45


 


WBC  4.1 L  


 


RBC  4.86  


 


Hgb  14.1  


 


Hct  42.4  


 


MCV  87.2  


 


MCH  29.0  


 


MCHC  33.3  


 


RDW  12.4  


 


Plt Count  258  


 


MPV  12.9 H  


 


Gran %  38.7 L  


 


Lymph % (Auto)  51.2 H  


 


Mono % (Auto)  6.4 H  


 


Eos % (Auto)  2.2  


 


Baso % (Auto)  1.5  


 


Gran #  1.57  


 


Lymph # (Auto)  2.1  


 


Mono # (Auto)  0.3  


 


Eos # (Auto)  0.1  


 


Baso # (Auto)  0.06  


 


Sodium   140 


 


Potassium   3.8 


 


Chloride   104 


 


Carbon Dioxide   26 


 


Anion Gap   13 


 


BUN   6 L 


 


Creatinine   0.6 L 


 


Est GFR ( Amer)   > 60 


 


Est GFR (Non-Af Amer)   > 60 


 


Random Glucose   101 


 


Calcium   9.8 


 


Phosphorus   3.6 


 


Magnesium   2.0 


 


Total Bilirubin   0.4 


 


AST   26 


 


ALT   32 


 


Alkaline Phosphatase   76 


 


Troponin I   


 


Total Protein   8.2 


 


Albumin   4.6 


 


Globulin   3.6 


 


Albumin/Globulin Ratio   1.3 


 


Urine Color    Light yellow


 


Urine Appearance    Clear


 


Urine pH    7.0


 


Ur Specific Gravity    1.010


 


Urine Protein    Negative


 


Urine Glucose (UA)    Negative


 


Urine Ketones    Negative


 


Urine Blood    Negative


 


Urine Nitrate    Negative


 


Urine Bilirubin    Negative


 


Urine Urobilinogen    0.2


 


Ur Leukocyte Esterase    Negative














  01/10/19





  19:00


 


WBC 


 


RBC 


 


Hgb 


 


Hct 


 


MCV 


 


MCH 


 


MCHC 


 


RDW 


 


Plt Count 


 


MPV 


 


Gran % 


 


Lymph % (Auto) 


 


Mono % (Auto) 


 


Eos % (Auto) 


 


Baso % (Auto) 


 


Gran # 


 


Lymph # (Auto) 


 


Mono # (Auto) 


 


Eos # (Auto) 


 


Baso # (Auto) 


 


Sodium 


 


Potassium 


 


Chloride 


 


Carbon Dioxide 


 


Anion Gap 


 


BUN 


 


Creatinine 


 


Est GFR ( Amer) 


 


Est GFR (Non-Af Amer) 


 


Random Glucose 


 


Calcium 


 


Phosphorus 


 


Magnesium 


 


Total Bilirubin 


 


AST 


 


ALT 


 


Alkaline Phosphatase 


 


Troponin I  < 0.01


 


Total Protein 


 


Albumin 


 


Globulin 


 


Albumin/Globulin Ratio 


 


Urine Color 


 


Urine Appearance 


 


Urine pH 


 


Ur Specific Gravity 


 


Urine Protein 


 


Urine Glucose (UA) 


 


Urine Ketones 


 


Urine Blood 


 


Urine Nitrate 


 


Urine Bilirubin 


 


Urine Urobilinogen 


 


Ur Leukocyte Esterase 














<Keyon Ordaz L - Last Filed: 01/10/19 20:02>





History of Present Illness





- History of Present Illness


History of Present Illness: 





Resident History & Physical for Hospitalist Service





Patient is a 58 year old female with past medical history of CAD s/p cardiac 

cath and herniated cervical disc presenting with chief complaint of syncope that

happened a week prior. Patient states that she was walking to her kitchen when 

she felt lightheaded and lost consciousness. She remembers waking up on the 

floor and feeling weak for several hours after. She denies tongue biting, 

bladder or bowel incontinence. She admits to occasionally seeing flashing 

lights, headache, numbness and tingling of upper and lower extremities. Denies 

any aggravating or alleviating factors. Denies fever, chills, chest pain, 

shortness of breath, abdominal pain, constipation, diarrhea, dysuria. As per 

family at bedside patient has been checking her blood pressure the past few days

and SBP has been in 160s. 





PMH: CAD, herniated disc


PSH: cardiac cath, breast biopsy 


SHx: denies alcohol, tobacco, illicit drug use 


FHx: father (HTN, stroke) 


Allergies: NKDA


Home meds: none 


PMD: none 





Present on Admission





- Present on Admission


Any Indicators Present on Admission: No





Review of Systems





- Review of Systems


All systems: reviewed and no additional remarkable complaints except (as stated 

in HPI)





Past Patient History





- Tetanus Immunizations


Tetanus Immunization: Unknown





- Past Social History


Smoking Status: Never Smoked





- CARDIAC


Hx Cardiac Disorders: Yes


Hx Hypertension: Yes





- PULMONARY


Hx Respiratory Disorders: No





- NEUROLOGICAL


Hx Neurological Disorder: No





- HEENT


Hx HEENT Problems: No





- RENAL


Hx Chronic Kidney Disease: Yes


Hx Kidney Stones: Yes (LEFT)





- ENDOCRINE/METABOLIC


Hx Endocrine Disorders: No





- HEMATOLOGICAL/ONCOLOGICAL


Hx Blood Disorders: No





- INTEGUMENTARY


Hx Dermatological Problems: No





- MUSCULOSKELETAL/RHEUMATOLOGICAL


Hx Musculoskeletal Disorders: No


Hx Falls: Yes (last saturday, lost LOC)





- GENITOURINARY/GYNECOLOGICAL


Hx Genitourinary Disorders: No (BX LEFT BREAST MASS)





- PSYCHIATRIC


Hx Psychophysiologic Disorder: No


Hx Substance Use: No





- SURGICAL HISTORY


Hx Cardiac Catheterization: Yes





- ANESTHESIA


Hx Anesthesia: Yes


Hx Anesthesia Reactions: No


Hx Malignant Hyperthermia: No





Results





- Vital Signs


Recent Vital Signs: 





                                Last Vital Signs











Temp  98.7 F   01/10/19 16:39


 


Pulse  90   01/10/19 16:39


 


Resp  18   01/10/19 16:39


 


BP  124/57 L  01/10/19 16:39


 


Pulse Ox  100   01/10/19 16:39














- Labs


Result Diagrams: 


                                 01/10/19 15:13





                                 01/10/19 15:13


Labs: 





                         Laboratory Results - last 24 hr











  01/10/19 01/10/19





  15:13 15:13


 


WBC  4.1 L 


 


RBC  4.86 


 


Hgb  14.1 


 


Hct  42.4 


 


MCV  87.2 


 


MCH  29.0 


 


MCHC  33.3 


 


RDW  12.4 


 


Plt Count  258 


 


MPV  12.9 H 


 


Gran %  38.7 L 


 


Lymph % (Auto)  51.2 H 


 


Mono % (Auto)  6.4 H 


 


Eos % (Auto)  2.2 


 


Baso % (Auto)  1.5 


 


Gran #  1.57 


 


Lymph # (Auto)  2.1 


 


Mono # (Auto)  0.3 


 


Eos # (Auto)  0.1 


 


Baso # (Auto)  0.06 


 


Sodium   140


 


Potassium   3.8


 


Chloride   104


 


Carbon Dioxide   26


 


Anion Gap   13


 


BUN   6 L


 


Creatinine   0.6 L


 


Est GFR ( Amer)   > 60


 


Est GFR (Non-Af Amer)   > 60


 


Random Glucose   101


 


Calcium   9.8


 


Phosphorus   3.6


 


Magnesium   2.0


 


Total Bilirubin   0.4


 


AST   26


 


ALT   32


 


Alkaline Phosphatase   76


 


Total Protein   8.2


 


Albumin   4.6


 


Globulin   3.6


 


Albumin/Globulin Ratio   1.3














Assessment & Plan





- Assessment and Plan (Free Text)


Assessment: 





Patient is a 58 year old female with past medical history of CAD s/p cardiac 

cath presenting with chief complaint of syncope.


Plan: 





Syncope 


- Head CT shows no acute intracranial findings 


- Head/neck CTA unremarkable 


- EKG shows NSR 


- ECHO 2015 showed EF 67%, normal LV function 


- followup repeat ECHO, EEG, orthostatics, TSH 


- PT eval 





Numbness


- cervical spine CT from 2015 showed disc bulge at C5-6 with bilateral 

spondylitic foraminal stenosis secondary to uncinate hypertrophy


- B12, folate


- Neurochecks


- Neurology consulted. Appreciate recs. 





PPX


- Lovenox 40 mg SC daily, SCDs





Case discussed with Dr. Kye Ordaz PGY-1 











- Date & Time


Date: 01/10/19


Time: 17:07





<Eric Fishman - Last Filed: 01/11/19 17:46>





Results





- Vital Signs


Recent Vital Signs: 





                                Last Vital Signs











Temp  97.5 F L  01/11/19 06:00


 


Pulse  63   01/11/19 10:00


 


Resp  20   01/11/19 06:00


 


BP  123/80   01/11/19 06:00


 


Pulse Ox  99   01/11/19 06:00














- Labs


Result Diagrams: 


                                 01/11/19 06:00





                                 01/11/19 06:00


Labs: 





                         Laboratory Results - last 24 hr











  01/10/19 01/10/19 01/11/19





  18:45 19:00 06:00


 


WBC    4.4 L


 


RBC    4.43


 


Hgb    12.4


 


Hct    39.0


 


MCV    88.0


 


MCH    28.0


 


MCHC    31.8


 


RDW    12.5


 


Plt Count    244


 


MPV    12.5 H


 


Gran %    28.1 L


 


Lymph % (Auto)    58.4 H


 


Mono % (Auto)    8.7 H


 


Eos % (Auto)    3.4


 


Baso % (Auto)    1.4


 


Gran #    1.23 L


 


Lymph # (Auto)    2.6


 


Mono # (Auto)    0.4


 


Eos # (Auto)    0.2


 


Baso # (Auto)    0.06


 


Sodium   


 


Potassium   


 


Chloride   


 


Carbon Dioxide   


 


Anion Gap   


 


BUN   


 


Creatinine   


 


Est GFR ( Amer)   


 


Est GFR (Non-Af Amer)   


 


Random Glucose   


 


Hemoglobin A1c   


 


Calcium   


 


Total Bilirubin   


 


AST   


 


ALT   


 


Alkaline Phosphatase   


 


Troponin I   < 0.01 


 


Total Protein   


 


Albumin   


 


Globulin   


 


Albumin/Globulin Ratio   


 


Triglycerides   


 


Cholesterol   


 


LDL Cholesterol Direct   


 


HDL Cholesterol   


 


Vitamin B12   


 


Folate   > 20.0 


 


TSH 3rd Generation   


 


Urine Color  Light yellow  


 


Urine Appearance  Clear  


 


Urine pH  7.0  


 


Ur Specific Gravity  1.010  


 


Urine Protein  Negative  


 


Urine Glucose (UA)  Negative  


 


Urine Ketones  Negative  


 


Urine Blood  Negative  


 


Urine Nitrate  Negative  


 


Urine Bilirubin  Negative  


 


Urine Urobilinogen  0.2  


 


Ur Leukocyte Esterase  Negative  














  01/11/19 01/11/19 01/11/19





  06:00 06:00 06:00


 


WBC   


 


RBC   


 


Hgb   


 


Hct   


 


MCV   


 


MCH   


 


MCHC   


 


RDW   


 


Plt Count   


 


MPV   


 


Gran %   


 


Lymph % (Auto)   


 


Mono % (Auto)   


 


Eos % (Auto)   


 


Baso % (Auto)   


 


Gran #   


 


Lymph # (Auto)   


 


Mono # (Auto)   


 


Eos # (Auto)   


 


Baso # (Auto)   


 


Sodium  140  


 


Potassium  4.3  


 


Chloride  108 H  


 


Carbon Dioxide  27  


 


Anion Gap  9 L  


 


BUN  7  


 


Creatinine  0.7  


 


Est GFR ( Amer)  > 60  


 


Est GFR (Non-Af Amer)  > 60  


 


Random Glucose  96  


 


Hemoglobin A1c   5.9 


 


Calcium  9.4  


 


Total Bilirubin  0.3  


 


AST  20  


 


ALT  31  


 


Alkaline Phosphatase  60  


 


Troponin I   


 


Total Protein  6.9  


 


Albumin  3.9  


 


Globulin  3.0  


 


Albumin/Globulin Ratio  1.3  


 


Triglycerides  77  


 


Cholesterol  180  


 


LDL Cholesterol Direct  105  


 


HDL Cholesterol  47  


 


Vitamin B12  430  


 


Folate   


 


TSH 3rd Generation    5.74 H


 


Urine Color   


 


Urine Appearance   


 


Urine pH   


 


Ur Specific Gravity   


 


Urine Protein   


 


Urine Glucose (UA)   


 


Urine Ketones   


 


Urine Blood   


 


Urine Nitrate   


 


Urine Bilirubin   


 


Urine Urobilinogen   


 


Ur Leukocyte Esterase   














Attending/Attestation





- Attestation


I have personally seen and examined this patient.: Yes


I have fully participated in the care of the patient.: Yes


I have reviewed all pertinent clinical information: Yes


Notes (Text): 





01/11/19 17:46





Attending note;





Patient seen and examined with resident in the ER.


Patient's brothers by the bedside.





Patient is alert and awake.


Complaining of nonspecific numbness.


Complaining of neck pain.





Patient is a 58 year old female with past medical history herniated cervical 

disc presenting with chief complaint of syncope that happened a week prior. 

Patient states that she was walking to her kitchen when she felt lightheaded and

lost consciousness.


Patient has complained of nonspecific numbness and weakness all over her body.





1. Syncope; patient complains of generalized nonspecific symptoms.


Questionable loss of consciousness.


No history of injury, tongue bite or incontinence. CT head is negative. CT of 

head and neck is negative.


Neurology evaluation requested.


Possible anxiety related issues suspected.


2. Orthostatic blood pressure ordered. EKG normal. Troponin 1 negative.





Monitor closely in telemetry.





Upon discharge patient will follow-up with PMD Dr. Morrison.
10-Jul-2021 01:03